# Patient Record
(demographics unavailable — no encounter records)

---

## 2025-04-01 NOTE — DISCUSSION/SUMMARY
[FreeTextEntry1] : 34 year old P0 at 10w 6d by CRL presenting with amenorrhea -f/u pap, GCT/CT -Dating sono- BREANNA 10/22/25 CRL 10w 6d not C/w LMP -unable to draw labs today, pt is dehydrated (will return 4/4) -Nose bleeds- Recommends using a humidifier -Skin irritation- Rx sent for Triamcinolone -Routine PNC reviewed including nutrition, exercise and safe medications in pregnancy -High Risk Issues: 2 Risk factors for PEC - will need ASA 81, Hypothyroidism measure TSH q trimester  -Depression Screening done today: PHQ2 = 1 -f/u 2 weeks for NT and NIPT

## 2025-04-01 NOTE — HISTORY OF PRESENT ILLNESS
[FreeTextEntry1] : Patient is a 34 year  old . LMP  2025. She had a positive home pregnancy test. She is endorsing occasional nausea, nose bleeds skin irritation.  [PapSmeardate] : 2015

## 2025-04-01 NOTE — PHYSICAL EXAM
[Chaperone Present] : A chaperone was present in the examining room during all aspects of the physical examination [Appropriately responsive] : appropriately responsive [Alert] : alert [No Acute Distress] : no acute distress [Soft] : soft [Non-tender] : non-tender [Non-distended] : non-distended [No HSM] : No HSM [No Lesions] : no lesions [No Mass] : no mass [Oriented x3] : oriented x3 [Examination Of The Breasts] : a normal appearance [No Masses] : no breast masses were palpable [Labia Majora] : normal [Labia Minora] : normal [Normal] : normal [Uterine Adnexae] : normal [FreeTextEntry2] : Yolanda

## 2025-04-01 NOTE — PROCEDURE
[Transvaginal OB Sonogram] : Transvaginal OB Sonogram [Intrauterine Pregnancy] : intrauterine pregnancy [Yolk Sac] : yolk sac present [Fetal Heart] : fetal heart present [Date: ___] : Date: [unfilled] [Transvaginal OB Sonogram WNL] : Transvaginal OB Sonogram WNL [FreeTextEntry1] : size not equal to dates, BREANNA 10/22/25